# Patient Record
Sex: FEMALE | Race: WHITE | NOT HISPANIC OR LATINO | URBAN - METROPOLITAN AREA
[De-identification: names, ages, dates, MRNs, and addresses within clinical notes are randomized per-mention and may not be internally consistent; named-entity substitution may affect disease eponyms.]

---

## 2021-03-05 ENCOUNTER — OFFICE (OUTPATIENT)
Dept: URBAN - METROPOLITAN AREA CLINIC 10 | Facility: CLINIC | Age: 66
End: 2021-03-05

## 2021-03-05 VITALS
DIASTOLIC BLOOD PRESSURE: 81 MMHG | SYSTOLIC BLOOD PRESSURE: 133 MMHG | HEART RATE: 76 BPM | HEIGHT: 66 IN | WEIGHT: 185 LBS

## 2021-03-05 DIAGNOSIS — R14.3 FLATULENCE: ICD-10-CM

## 2021-03-05 PROCEDURE — 99204 OFFICE O/P NEW MOD 45 MIN: CPT | Performed by: PHYSICIAN ASSISTANT

## 2021-03-05 RX ORDER — CIPROFLOXACIN 500 MG/1
TABLET, FILM COATED ORAL
Qty: 20 | Refills: 0 | Status: COMPLETED
Start: 2021-03-05 | End: 2024-06-27

## 2021-03-05 RX ORDER — METRONIDAZOLE 250 MG/1
TABLET, FILM COATED ORAL
Qty: 30 | Refills: 0 | Status: COMPLETED
Start: 2021-03-05 | End: 2024-06-27

## 2021-03-05 RX ORDER — POLYETHYLENE GLYCOL 3350, SODIUM SULFATE, SODIUM CHLORIDE, POTASSIUM CHLORIDE, ASCORBIC ACID, SODIUM ASCORBATE 140-9-5.2G
KIT ORAL
Qty: 1 | Refills: 0 | Status: COMPLETED
Start: 2021-03-05 | End: 2024-06-27

## 2024-06-27 ENCOUNTER — OFFICE (OUTPATIENT)
Dept: URBAN - METROPOLITAN AREA CLINIC 10 | Facility: CLINIC | Age: 69
End: 2024-06-27

## 2024-06-27 VITALS
SYSTOLIC BLOOD PRESSURE: 137 MMHG | HEART RATE: 68 BPM | WEIGHT: 170 LBS | DIASTOLIC BLOOD PRESSURE: 68 MMHG | OXYGEN SATURATION: 98 % | HEIGHT: 66 IN

## 2024-06-27 DIAGNOSIS — R11.11 VOMITING WITHOUT NAUSEA: ICD-10-CM

## 2024-06-27 DIAGNOSIS — K58.0 IRRITABLE BOWEL SYNDROME WITH DIARRHEA: ICD-10-CM

## 2024-06-27 PROCEDURE — 99204 OFFICE O/P NEW MOD 45 MIN: CPT | Performed by: INTERNAL MEDICINE

## 2024-06-27 RX ORDER — ONDANSETRON 8 MG/1
TABLET, ORALLY DISINTEGRATING ORAL
Qty: 30 | Refills: 1 | Status: ACTIVE
Start: 2024-06-27

## 2024-06-27 RX ORDER — RIFAXIMIN 550 MG/1
TABLET ORAL
Qty: 42 | Refills: 0 | Status: COMPLETED
Start: 2024-06-27 | End: 2024-07-01

## 2024-06-27 NOTE — SERVICEHPINOTES
Ms. Hernandez is 69 years old. She is here for initial visit with me for chief complaint of foul gas. She has had this complaint for over 10 years. She denies diarrhea. She complains of constipation with a stool frequency of 2-3 times daily however, she describes constipation due to the fact that her stools are hard. She denies blood in her stool. She states she had a Cologuard within the past year. She takes oxycodone but states she only takes about once per week. She is also taking diclofenac. She takes MiraLax daily but now is only taking whenever she takes oxycodone. She does complain of postprandial vomiting. She describes eating that will be followed by nausea that will be followed by vomiting within 5-10 minutes of eating. She denies any weight loss or hematemesis. She denies any dysphagia. This has been occurring for the past 6 months. She denies any temporal event prior to starting nausea and vomiting.

## 2024-07-01 ENCOUNTER — AMBULATORY SURGICAL CENTER (OUTPATIENT)
Dept: URBAN - METROPOLITAN AREA SURGERY 1 | Facility: SURGERY | Age: 69
End: 2024-07-01
Payer: MEDICARE

## 2024-07-01 ENCOUNTER — OFFICE (OUTPATIENT)
Dept: URBAN - METROPOLITAN AREA PATHOLOGY 12 | Facility: PATHOLOGY | Age: 69
End: 2024-07-01

## 2024-07-01 VITALS
HEART RATE: 63 BPM | OXYGEN SATURATION: 99 % | RESPIRATION RATE: 18 BRPM | RESPIRATION RATE: 18 BRPM | DIASTOLIC BLOOD PRESSURE: 68 MMHG | TEMPERATURE: 97.5 F | RESPIRATION RATE: 20 BRPM | DIASTOLIC BLOOD PRESSURE: 65 MMHG | TEMPERATURE: 97.5 F | SYSTOLIC BLOOD PRESSURE: 114 MMHG | SYSTOLIC BLOOD PRESSURE: 151 MMHG | TEMPERATURE: 98.2 F | OXYGEN SATURATION: 99 % | SYSTOLIC BLOOD PRESSURE: 118 MMHG | WEIGHT: 169 LBS | HEART RATE: 68 BPM | OXYGEN SATURATION: 97 % | DIASTOLIC BLOOD PRESSURE: 80 MMHG | DIASTOLIC BLOOD PRESSURE: 80 MMHG | DIASTOLIC BLOOD PRESSURE: 65 MMHG | HEART RATE: 65 BPM | SYSTOLIC BLOOD PRESSURE: 111 MMHG | RESPIRATION RATE: 16 BRPM | OXYGEN SATURATION: 100 % | SYSTOLIC BLOOD PRESSURE: 151 MMHG | HEIGHT: 66 IN | HEIGHT: 66 IN | HEART RATE: 64 BPM | HEART RATE: 68 BPM | HEART RATE: 63 BPM | DIASTOLIC BLOOD PRESSURE: 52 MMHG | HEART RATE: 65 BPM | DIASTOLIC BLOOD PRESSURE: 74 MMHG | TEMPERATURE: 98.2 F | DIASTOLIC BLOOD PRESSURE: 52 MMHG | DIASTOLIC BLOOD PRESSURE: 74 MMHG | SYSTOLIC BLOOD PRESSURE: 118 MMHG | DIASTOLIC BLOOD PRESSURE: 68 MMHG | SYSTOLIC BLOOD PRESSURE: 101 MMHG | RESPIRATION RATE: 16 BRPM | SYSTOLIC BLOOD PRESSURE: 111 MMHG | HEART RATE: 64 BPM | HEART RATE: 61 BPM | HEART RATE: 61 BPM | WEIGHT: 169 LBS | SYSTOLIC BLOOD PRESSURE: 114 MMHG | OXYGEN SATURATION: 100 % | RESPIRATION RATE: 20 BRPM | OXYGEN SATURATION: 97 % | SYSTOLIC BLOOD PRESSURE: 101 MMHG

## 2024-07-01 DIAGNOSIS — K29.50 UNSPECIFIED CHRONIC GASTRITIS WITHOUT BLEEDING: ICD-10-CM

## 2024-07-01 DIAGNOSIS — R11.11 VOMITING WITHOUT NAUSEA: ICD-10-CM

## 2024-07-01 DIAGNOSIS — K44.9 DIAPHRAGMATIC HERNIA WITHOUT OBSTRUCTION OR GANGRENE: ICD-10-CM

## 2024-07-01 DIAGNOSIS — K31.89 OTHER DISEASES OF STOMACH AND DUODENUM: ICD-10-CM

## 2024-07-01 PROBLEM — K20.80 OTHER ESOPHAGITIS WITHOUT BLEEDING: Status: ACTIVE | Noted: 2024-07-01

## 2024-07-01 PROBLEM — K20.90 ESOPHAGITIS, UNSPECIFIED WITHOUT BLEEDING: Status: ACTIVE | Noted: 2024-07-01

## 2024-07-01 PROCEDURE — 88305 TISSUE EXAM BY PATHOLOGIST: CPT | Performed by: PATHOLOGY

## 2024-07-01 PROCEDURE — 43239 EGD BIOPSY SINGLE/MULTIPLE: CPT | Performed by: INTERNAL MEDICINE

## 2024-07-01 RX ORDER — OMEPRAZOLE 40 MG/1
CAPSULE, DELAYED RELEASE ORAL
Qty: 90 | Refills: 3 | Status: ACTIVE
Start: 2024-07-01

## 2024-07-01 RX ADMIN — PROPOFOL 120 MG: 10 INJECTION, EMULSION INTRAVENOUS at 13:40

## 2024-07-09 LAB
GASTRO ONE PATHOLOGY: PDF REPORT: (no result)
GASTRO ONE PATHOLOGY: PDF REPORT: (no result)

## 2024-10-29 ENCOUNTER — OFFICE (OUTPATIENT)
Dept: URBAN - METROPOLITAN AREA CLINIC 10 | Facility: CLINIC | Age: 69
End: 2024-10-29
Payer: MEDICARE

## 2024-10-29 VITALS
SYSTOLIC BLOOD PRESSURE: 96 MMHG | DIASTOLIC BLOOD PRESSURE: 56 MMHG | HEIGHT: 67 IN | OXYGEN SATURATION: 96 % | WEIGHT: 168 LBS | HEART RATE: 81 BPM

## 2024-10-29 DIAGNOSIS — R19.7 DIARRHEA, UNSPECIFIED: ICD-10-CM

## 2024-10-29 DIAGNOSIS — A49.8 OTHER BACTERIAL INFECTIONS OF UNSPECIFIED SITE: ICD-10-CM

## 2024-10-29 PROCEDURE — 99214 OFFICE O/P EST MOD 30 MIN: CPT | Performed by: NURSE PRACTITIONER

## 2024-10-29 RX ORDER — FIDAXOMICIN 200 MG/1
TABLET, FILM COATED ORAL
Qty: 20 | Refills: 0 | Status: ACTIVE
Start: 2024-10-29

## 2024-10-30 LAB
CBC WITH DIFFERENTIAL/PLATELET: BASO (ABSOLUTE): 0.1 X10E3/UL (ref 0–0.2)
CBC WITH DIFFERENTIAL/PLATELET: BASOS: 0 %
CBC WITH DIFFERENTIAL/PLATELET: EOS (ABSOLUTE): 0.2 X10E3/UL (ref 0–0.4)
CBC WITH DIFFERENTIAL/PLATELET: EOS: 1 %
CBC WITH DIFFERENTIAL/PLATELET: HEMATOCRIT: 38.9 % (ref 34–46.6)
CBC WITH DIFFERENTIAL/PLATELET: HEMATOLOGY COMMENTS: (no result)
CBC WITH DIFFERENTIAL/PLATELET: HEMOGLOBIN: 13 G/DL (ref 11.1–15.9)
CBC WITH DIFFERENTIAL/PLATELET: IMMATURE CELLS: (no result)
CBC WITH DIFFERENTIAL/PLATELET: IMMATURE GRANS (ABS): 0.1 X10E3/UL (ref 0–0.1)
CBC WITH DIFFERENTIAL/PLATELET: IMMATURE GRANULOCYTES: 1 %
CBC WITH DIFFERENTIAL/PLATELET: LYMPHS (ABSOLUTE): 1.5 X10E3/UL (ref 0.7–3.1)
CBC WITH DIFFERENTIAL/PLATELET: LYMPHS: 10 %
CBC WITH DIFFERENTIAL/PLATELET: MCH: 29 PG (ref 26.6–33)
CBC WITH DIFFERENTIAL/PLATELET: MCHC: 33.4 G/DL (ref 31.5–35.7)
CBC WITH DIFFERENTIAL/PLATELET: MCV: 87 FL (ref 79–97)
CBC WITH DIFFERENTIAL/PLATELET: MONOCYTES(ABSOLUTE): 1.2 X10E3/UL — HIGH (ref 0.1–0.9)
CBC WITH DIFFERENTIAL/PLATELET: MONOCYTES: 8 %
CBC WITH DIFFERENTIAL/PLATELET: NEUTROPHILS (ABSOLUTE): 12.3 X10E3/UL — HIGH (ref 1.4–7)
CBC WITH DIFFERENTIAL/PLATELET: NEUTROPHILS: 80 %
CBC WITH DIFFERENTIAL/PLATELET: NRBC: (no result)
CBC WITH DIFFERENTIAL/PLATELET: PLATELETS: 352 X10E3/UL (ref 150–450)
CBC WITH DIFFERENTIAL/PLATELET: RBC: 4.49 X10E6/UL (ref 3.77–5.28)
CBC WITH DIFFERENTIAL/PLATELET: RDW: 12.7 % (ref 11.7–15.4)
CBC WITH DIFFERENTIAL/PLATELET: WBC: 15.3 X10E3/UL — HIGH (ref 3.4–10.8)
COMP. METABOLIC PANEL (14): ALBUMIN: 3.5 G/DL — LOW (ref 3.9–4.9)
COMP. METABOLIC PANEL (14): ALKALINE PHOSPHATASE: 78 IU/L (ref 44–121)
COMP. METABOLIC PANEL (14): ALT (SGPT): 7 IU/L (ref 0–32)
COMP. METABOLIC PANEL (14): AST (SGOT): 12 IU/L (ref 0–40)
COMP. METABOLIC PANEL (14): BILIRUBIN, TOTAL: 1.5 MG/DL — HIGH (ref 0–1.2)
COMP. METABOLIC PANEL (14): BUN/CREATININE RATIO: 19 (ref 12–28)
COMP. METABOLIC PANEL (14): BUN: 19 MG/DL (ref 8–27)
COMP. METABOLIC PANEL (14): CALCIUM: 9.3 MG/DL (ref 8.7–10.3)
COMP. METABOLIC PANEL (14): CARBON DIOXIDE, TOTAL: 27 MMOL/L (ref 20–29)
COMP. METABOLIC PANEL (14): CHLORIDE: 94 MMOL/L — LOW (ref 96–106)
COMP. METABOLIC PANEL (14): CREATININE: 0.98 MG/DL (ref 0.57–1)
COMP. METABOLIC PANEL (14): EGFR: 62 ML/MIN/1.73 (ref 59–?)
COMP. METABOLIC PANEL (14): GLOBULIN, TOTAL: 1.9 G/DL (ref 1.5–4.5)
COMP. METABOLIC PANEL (14): GLUCOSE: 102 MG/DL — HIGH (ref 70–99)
COMP. METABOLIC PANEL (14): POTASSIUM: 3.1 MMOL/L — LOW (ref 3.5–5.2)
COMP. METABOLIC PANEL (14): PROTEIN, TOTAL: 5.4 G/DL — LOW (ref 6–8.5)
COMP. METABOLIC PANEL (14): SODIUM: 134 MMOL/L (ref 134–144)

## 2024-12-19 ENCOUNTER — OFFICE (OUTPATIENT)
Dept: URBAN - METROPOLITAN AREA CLINIC 10 | Facility: CLINIC | Age: 69
End: 2024-12-19
Payer: MEDICARE

## 2024-12-19 VITALS
WEIGHT: 169 LBS | HEIGHT: 67 IN | OXYGEN SATURATION: 97 % | SYSTOLIC BLOOD PRESSURE: 120 MMHG | HEART RATE: 81 BPM | DIASTOLIC BLOOD PRESSURE: 42 MMHG

## 2024-12-19 DIAGNOSIS — R19.5 OTHER FECAL ABNORMALITIES: ICD-10-CM

## 2024-12-19 DIAGNOSIS — K20.90 ESOPHAGITIS, UNSPECIFIED WITHOUT BLEEDING: ICD-10-CM

## 2024-12-19 DIAGNOSIS — A04.72 ENTEROCOLITIS DUE TO CLOSTRIDIUM DIFFICILE, NOT SPECIFIED AS: ICD-10-CM

## 2024-12-19 PROCEDURE — 99214 OFFICE O/P EST MOD 30 MIN: CPT | Performed by: NURSE PRACTITIONER

## 2024-12-19 RX ORDER — POLYETHYLENE GLYCOL 3350, SODIUM SULFATE, SODIUM CHLORIDE, POTASSIUM CHLORIDE, ASCORBIC ACID, SODIUM ASCORBATE 140-9-5.2G
KIT ORAL
Qty: 1 | Refills: 0 | Status: ACTIVE
Start: 2024-12-19

## 2024-12-19 NOTE — SERVICEHPINOTES
Ms. Hernandez is 69 years old. The patient, with a history of C. diff infection, presents with concerns of recurrent infection. She reports changes in bowel habits, but denies experiencing diarrhea. The stool consistency varies, sometimes appearing long and snake-like, and the patient reports going to the bathroom twice a day. She also reports foul-smelling gas and bloating, with occasional abdominal pain. The patient has been taking Miralax daily for an unspecified duration, which she believes may be contributing to her symptoms.The patient also reports chronic back pain, managed with sporadic use of pain medication. She expresses concerns about dependency and thus only takes the medication when the pain becomes intolerable. She has previously undergone pain management interventions, but these were reportedly unsuccessful.The patient has also recently started taking vitamins B and D, as well as pre and pro probiotics. She has not noticed any significant improvement since starting these supplements.The patient has a history of cancer, treated in 2015, and has lost a son in a motorcycle accident in 2016. She expresses ongoing grief and emotional distress related to these events.br
br12/2024 CBC [id number]/2024 Ova &amp Parasites negativebr-CT AP w/o cont: 1.1 cm cyst in liver normal gallbladder and bile ducts, pancreas, adrenals and kidneys wall thickening in the hepatic flexure and much of the sigmoid colon and rectum.10/2024 WBC 15.3/Hgb 13/MCV 87/ Creat 0.9/sodium 134/K 3.1/ALB 3.5/TB 1.5/ALP 78/AST 12/ALT 7

## 2025-02-26 ENCOUNTER — AMBULATORY SURGICAL CENTER (OUTPATIENT)
Dept: URBAN - METROPOLITAN AREA SURGERY 1 | Facility: SURGERY | Age: 70
End: 2025-02-26
Payer: MEDICARE

## 2025-02-26 ENCOUNTER — OFFICE (OUTPATIENT)
Dept: URBAN - METROPOLITAN AREA PATHOLOGY 12 | Facility: PATHOLOGY | Age: 70
End: 2025-02-26
Payer: MEDICARE

## 2025-02-26 VITALS
HEIGHT: 67 IN | DIASTOLIC BLOOD PRESSURE: 45 MMHG | HEART RATE: 69 BPM | HEART RATE: 76 BPM | SYSTOLIC BLOOD PRESSURE: 131 MMHG | DIASTOLIC BLOOD PRESSURE: 55 MMHG | OXYGEN SATURATION: 98 % | TEMPERATURE: 97.2 F | RESPIRATION RATE: 17 BRPM | SYSTOLIC BLOOD PRESSURE: 93 MMHG | DIASTOLIC BLOOD PRESSURE: 68 MMHG | DIASTOLIC BLOOD PRESSURE: 54 MMHG | HEART RATE: 69 BPM | WEIGHT: 167 LBS | OXYGEN SATURATION: 99 % | HEART RATE: 73 BPM | TEMPERATURE: 97.9 F | SYSTOLIC BLOOD PRESSURE: 94 MMHG | HEART RATE: 71 BPM | OXYGEN SATURATION: 100 % | RESPIRATION RATE: 18 BRPM | SYSTOLIC BLOOD PRESSURE: 116 MMHG | SYSTOLIC BLOOD PRESSURE: 116 MMHG | SYSTOLIC BLOOD PRESSURE: 131 MMHG | SYSTOLIC BLOOD PRESSURE: 110 MMHG | DIASTOLIC BLOOD PRESSURE: 68 MMHG | DIASTOLIC BLOOD PRESSURE: 63 MMHG | DIASTOLIC BLOOD PRESSURE: 63 MMHG | OXYGEN SATURATION: 100 % | RESPIRATION RATE: 18 BRPM | RESPIRATION RATE: 16 BRPM | WEIGHT: 167 LBS | SYSTOLIC BLOOD PRESSURE: 94 MMHG | DIASTOLIC BLOOD PRESSURE: 55 MMHG | RESPIRATION RATE: 16 BRPM | OXYGEN SATURATION: 98 % | RESPIRATION RATE: 17 BRPM | DIASTOLIC BLOOD PRESSURE: 54 MMHG | HEART RATE: 76 BPM | TEMPERATURE: 97.2 F | TEMPERATURE: 97.9 F | HEART RATE: 73 BPM | SYSTOLIC BLOOD PRESSURE: 93 MMHG | HEIGHT: 67 IN | HEART RATE: 71 BPM | DIASTOLIC BLOOD PRESSURE: 45 MMHG | SYSTOLIC BLOOD PRESSURE: 110 MMHG | OXYGEN SATURATION: 99 %

## 2025-02-26 DIAGNOSIS — K20.90 ESOPHAGITIS, UNSPECIFIED WITHOUT BLEEDING: ICD-10-CM

## 2025-02-26 DIAGNOSIS — K44.9 DIAPHRAGMATIC HERNIA WITHOUT OBSTRUCTION OR GANGRENE: ICD-10-CM

## 2025-02-26 DIAGNOSIS — K63.5 POLYP OF COLON: ICD-10-CM

## 2025-02-26 DIAGNOSIS — Z12.11 ENCOUNTER FOR SCREENING FOR MALIGNANT NEOPLASM OF COLON: ICD-10-CM

## 2025-02-26 DIAGNOSIS — K22.70 BARRETT'S ESOPHAGUS WITHOUT DYSPLASIA: ICD-10-CM

## 2025-02-26 DIAGNOSIS — K31.89 OTHER DISEASES OF STOMACH AND DUODENUM: ICD-10-CM

## 2025-02-26 DIAGNOSIS — K57.30 DIVERTICULOSIS OF LARGE INTESTINE WITHOUT PERFORATION OR ABS: ICD-10-CM

## 2025-02-26 PROCEDURE — 43239 EGD BIOPSY SINGLE/MULTIPLE: CPT | Mod: 51 | Performed by: INTERNAL MEDICINE

## 2025-02-26 PROCEDURE — 88305 TISSUE EXAM BY PATHOLOGIST: CPT | Performed by: PATHOLOGY

## 2025-02-26 PROCEDURE — 45380 COLONOSCOPY AND BIOPSY: CPT | Mod: PT | Performed by: INTERNAL MEDICINE

## 2025-02-28 LAB
GASTRO ONE PATHOLOGY: PDF REPORT: (no result)
GASTRO ONE PATHOLOGY: PDF REPORT: (no result)

## 2025-06-19 ENCOUNTER — OFFICE (OUTPATIENT)
Dept: URBAN - METROPOLITAN AREA CLINIC 10 | Facility: CLINIC | Age: 70
End: 2025-06-19

## 2025-06-19 VITALS
OXYGEN SATURATION: 97 % | WEIGHT: 176 LBS | DIASTOLIC BLOOD PRESSURE: 63 MMHG | SYSTOLIC BLOOD PRESSURE: 122 MMHG | HEART RATE: 73 BPM | HEIGHT: 66 IN

## 2025-06-19 DIAGNOSIS — K58.0 IRRITABLE BOWEL SYNDROME WITH DIARRHEA: ICD-10-CM

## 2025-06-19 PROCEDURE — 99214 OFFICE O/P EST MOD 30 MIN: CPT | Performed by: NURSE PRACTITIONER

## 2025-06-19 RX ORDER — RIFAXIMIN 550 MG/1
TABLET ORAL
Qty: 42 | Refills: 2 | Status: ACTIVE
Start: 2025-06-19

## 2025-06-19 NOTE — SERVICEHPINOTES
Ms. Hernandez is 70 years old. She is here today for follow up diarrhea, bloating, gas, abdominal cramping. She experiences floating stools and excessive gas, despite eating increased fiber. Bowel movements occur a couple of times a day with a need for excessive wiping, using wet wipes. She previously used Miralax daily but no longer requires it. Stools are small, thin, and soft, with no recent occurrence of hard stools.She recalls a past surgical intervention for a bladder sling that resulted in intestinal complications, though she is unsure if any part of her intestine was removed. She has a history of C. diff infections and was treated with antibiotics. She reports a hernia in the area of a previous surgery, associated with some discomfort.12/2024 CBC [id number]/2024 Ova &amp Parasites negativebr-CT AP w/o cont: 1.1 cm cyst in liver normal gallbladder and bile ducts, pancreas, adrenals and kidneys wall thickening in the hepatic flexure and much of the sigmoid colon and rectum.10/2024 WBC 15.3/Hgb 13/MCV 87/ Creat 0.9/sodium 134/K 3.1/ALB 3.5/TB 1.5/ALP 78/AST 12/ALT 7